# Patient Record
(demographics unavailable — no encounter records)

---

## 2024-11-14 NOTE — HISTORY OF PRESENT ILLNESS
[FreeTextEntry1] : Follow-up for BPH with urinary retention, patient is taking Flomax 2 capsules and Proscar once daily, used to do CIC once daily which patient stopped by the end of May, reports doing well since stopping CIC, occasional dripping of urine after urination, otherwise no urological symptom. PVR 161ml today  He walks with a walker.  Patient is currently on Parkinson's medication with carbidopa.  Patient is asymptomatic.  Continue Flomax and Proscar.  Follow-up in 3 months for repeat PVR.  Please refer to URO Consult Note.

## 2024-11-14 NOTE — LETTER BODY
[FreeTextEntry1] : Riaz Hill MD [address], [address], [place] [address] [phone]  Dear Dr. Hill,  Reason for Visit: Urinary retention  This is a 78 year-old gentleman with urinary retention. Patient has history Guillain-[place] syndrome. His last episode was approximately 1 year ago after he received vaccination. He is slowly recovering from his GBS. UDS previously demonstrated evidence of detrusor instability with bladder obstruction. Patient has failed 3 voiding trials. He walks with a walker. Patient is currently on Parkinson's medication with carbidopa. Patient returns today for follow-up. Since he was last seen, patient continue to take Flomax BID and Proscar.  Patient reports taking the medications regularly without any side effects or difficulties with the medication. Patient stopped CIC at the end of May 2024. He reports stable symptoms ever since stopping CIC. Patient denies any gross hematuria or dysuria or urinary incontinence. The patient denies any aggravating or relieving factors. The patient denies any interference of function. The patient is entirely asymptomatic. All other review of systems are negative. He has no cancer in his family medical history. He has no previous surgical history. Past medical history, family history and social history were inquired and were noncontributory to current condition. The patient does not use tobacco or drink alcohol. Medications and allergies were reviewed. He has no known allergies to medication.  On examination, the patient is a frail-appearing gentleman in no acute distress. He is alert and oriented follows commands. He has normal mood and affect. He is normocephalic. Oral no thrush. Neck is supple. Respirations are unlabored. His abdomen is soft and nontender. Liver is nonpalpable. Bladder is nonpalpable. No CVA tenderness. Neurologically he is grossly intact. He stands with assistance. No peripheral edema. Skin without gross abnormality. He walks with a walker.   Post-void residual on bladder scan today was 161 cc.   Assessment: BPH. Urinary retention. Guillain-[place] syndrome.  I counseled the patient. Patient reports stable symptoms with medical therapy. He will continue tot take Flomax BID and Proscar.  I renewed the patient's prescription for Flomax BID and Proscar today. I encouraged the patient to continue medications regularly as directed. Patient used to perform CIC but stopped at the end of May 2024. He reports doing well since he stopped CIC. His PVR today was 161 cc. Patient is asymptomatic. I recommended the patient follow-up in 3 months to repeat PVR evaluation. Risks and alternatives were discussed. I answered the patient questions. The patient will follow-up as directed and will contact me with any questions or concerns. Thank you for the opportunity to participate in the care of this patient. I'll keep you updated on his progress.  Plan: Continue Flomax BID and Proscar. PVR. Follow-up in 3 months for repeat PVR.  I spent 30-minutes time today on all issues related to this patient on today date of service including non face to face time.

## 2024-11-14 NOTE — ADDENDUM
[FreeTextEntry1] : Entered by Alexander Gill, acting as scribe for Dr. Riaz Quijano. The documentation recorded by the scribe accurately reflects the service I personally performed and the decisions made by me.

## 2024-12-04 NOTE — DISCUSSION/SUMMARY
[FreeTextEntry1] : EKG today shows:  Sinus Rhythm at 69 bpm. WITHIN NORMAL LIMITS. No change compared to prior tracing.  PLAN: 1.  Hypertension - BP remains in normal range. -  continue fosinopril 5 mg qd (1/2 of a 10 mg. tab).    2.  Systolic murmur due to aortic valve stenosis - murmur unchanged - will arrange TTE at next O.V. to re-assess (last TTE was in Nov. 2022).    3.  Mild enlargement of asc. aorta on last TTE; will also re-assess with f/u TTE in the spring of 2025.  4.  HLD - lipid profile in 6/2024 was in good range. - Continue current dose of Lipitor.  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  32 minutes spent on today's office visit.   He will return here for a follow up visit in 6 mos. with TTE at that time. [EKG obtained to assist in diagnosis and management of assessed problem(s)] : EKG obtained to assist in diagnosis and management of assessed problem(s)

## 2024-12-04 NOTE — ASSESSMENT
[FreeTextEntry1] : ================================== Hypertension - well controlled at this time.  Mild aortic valve stenosis on echo Nov. 2022.  Hyperlipidemia.  Mild enlargement of ascending aorta  Transient ischemic attack in 2014. Mechanism was likely hypertensive, as no arrhythmia was found on rhythm recordings. No intrinsic vascular disease seen on brain MRA. No recurrence of neuro symptoms.  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

## 2024-12-04 NOTE — PHYSICAL EXAM

## 2024-12-04 NOTE — REASON FOR VISIT
[FreeTextEntry1] :   Ramesh Cornell returns for follow-up regarding hypertension, HLD, mild AS and history of a TIA in the past.

## 2024-12-04 NOTE — ASSESSMENT
[FreeTextEntry1] : ================================== Hypertension - well controlled at this time.  Mild aortic valve stenosis on echo Nov. 2022.  Hyperlipidemia.  Mild enlargement of ascending aorta  Transient ischemic attack in 2014. Mechanism was likely hypertensive, as no arrhythmia was found on rhythm recordings. No intrinsic vascular disease seen on brain MRA. No recurrence of neuro symptoms.  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

## 2024-12-04 NOTE — PHYSICAL EXAM

## 2024-12-04 NOTE — HISTORY OF PRESENT ILLNESS
[FreeTextEntry1] : He was hospitalized in Oct. 2023 for worsening gait instability; he received the flu shot & had a root canal a few days prior.  Neurology evaluation was c/w a GBS variant component, occurring after receiving the flu vaccine. He underwent an LP, which suggested Bir[place] brainstem encephalitis.   He was treated with a 5 day course of IVIG, with improved mentation and strength.  His hospital course was complicated by urinary retention requiring Hwang catheter. He was medically stabilized and cleared for discharge to [facility].  (17 Oct 2023 15:03)  As he returns here today, he remains stable.  He continues to f/u with neuro and has been stable overall.  Currently, Sinemet has been restarted and he thinks that it has been helping.    From a cardiac standpoint, he reports no symptoms -there have been no episodes of exertional chest discomfort or TRACY.  He reports no palpitations and describes no episodes of lightheadedness/LOC.  There have been no episodes of orthopnea or PND.  He will need dental work and treatment of a R nhi-orbital basal cell (sees Dr. Dela Cruz).

## 2024-12-04 NOTE — HISTORY OF PRESENT ILLNESS
[FreeTextEntry1] : He was hospitalized in Oct. 2023 for worsening gait instability; he received the flu shot & had a root canal a few days prior.  Neurology evaluation was c/w a GBS variant component, occurring after receiving the flu vaccine. He underwent an LP, which suggested [place] brainstem encephalitis.   He was treated with a 5 day course of IVIG, with improved mentation and strength.  His hospital course was complicated by urinary retention requiring Hwang catheter. He was medically stabilized and cleared for discharge to [facility].  (17 Oct 2023 15:03)  As he returns here today, he remains stable.  He continues to f/u with neuro and has been stable overall.  Currently, Sinemet has been restarted and he thinks that it has been helping.    From a cardiac standpoint, he reports no symptoms -there have been no episodes of exertional chest discomfort or TRACY.  He reports no palpitations and describes no episodes of lightheadedness/LOC.  There have been no episodes of orthopnea or PND.  He will need dental work and treatment of a R nhi-orbital basal cell (sees Dr. Dela Cruz).

## 2025-01-09 NOTE — HISTORY OF PRESENT ILLNESS
[FreeTextEntry1] : The patient is here for a routine visit. He comes in with his wife.  [de-identified] : He is managing at home.  He uses a walker outside the home.  Diet and appetite are ok.    No chest pain or dyspnea.

## 2025-01-09 NOTE — HISTORY OF PRESENT ILLNESS
[FreeTextEntry1] : The patient is here for a routine visit. He comes in with his wife.  [de-identified] : He is managing at home.  He uses a walker outside the home.  Diet and appetite are ok.    No chest pain or dyspnea.

## 2025-01-09 NOTE — ASSESSMENT
[FreeTextEntry1] : He is managing at home.  Discussed diet and exercise.  He is ok with ADLs.  Check lipids on Atorvastatin.  BP good at 125/70.  He saw his urologist in 11/24- stable.   He saw his cardiologist last month.  Check a HgBA1c.  He avoids vaccines given the neurologic condition.  He has an eyelid BCC and he has seen Dr. Lauren and he is having an RT evaluation.  He will have reports sent.

## 2025-03-05 NOTE — PHYSICAL EXAM
[FreeTextEntry1] : Alert and in no distress, AO x 3 + Hypomimia, mild Hypophonia with slight effortful speech (mild dysarthria) No facial asymmetry Decreased blinking, Reduced facial expression mouth open at times during the exam PERRL Sensations intact LT.  No resting, action or postural tremor noted.  Hand opening and closing normal on the right questionable 1 on the left Rapid alternating movements normal bilaterally Finger taps normal bilaterally Leg agility normal on the right 1 on the left Reflexes: R L  Biceps 2+ 3+  Patellars 3++ 3++  Ankles 3+ 3+ Walks a few steps without the walker no shuffling seen, wide-based gait

## 2025-03-05 NOTE — DISCUSSION/SUMMARY
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 He was treated with immunotherapy (s/p IV steroids + 6 sessions of PLEX followed by IVIG) with significant improvement in symptoms- suggesting an underlying autoimmune/inflammatory condition.  He was readmitted to [facility] 10/2023 for recurrent weakness following the Flu vaccine and repeat CT imaging of neuroaxis unrevealing with similar findings on CSF with TNC 36 and elevated protein (117) with 2 oligoclonal bands. Pt once again treated with IVIG with significant improvement in symptoms.  In October 2023 developed gradual onset of tremors lower extremities greater than upper extremities left worse than right.  He was initiated on Sinemet and reports about 50% improvement in tremors and also better ability to ambulate.  Denies any side effects has other soft features such as reduced facial expression and reduced blink rate slightly soft speech no tremors were seen very mild left sided bradykinesia in upper extremities reduced leg agility especially on the left.  Subtle increase in tone. Father had Parkinson's disease, FDopa PET scan done in 2024 was negative in May Sinemet was discontinued.  Today patient presents with the above symptoms of not feeling as good feeling very tired having more difficulty walking feeling afraid that he will fall being excessively tired.  Legs feeling tired after walking a short distance feeling better while ambulating with walker.  Unclear if this is related to stopping levodopa his exam is unchanged compared to the time when he was on the medication  In January 2025 he had dental work with Novocain.  A few days after that he had rapid decline in mobility gait was ataxic he was admitted to [facility] and received IVIG and rehab at [place].  At this visit MRI brain and spine was done, MRI brain with multiple white matter lesions ? Demyelinating  Imp:-  # CNS inflammatory disorder- unknown etiology. ? 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 Serum MOG ab negative.   # Tremors-parkinsonism family hx + PD in father.  He feels tremors improved on sinemet  Plan:-Unclear etiology of patient's symptoms his exam has not changed on stopping Sinemet however he does feel worse since that has been stopped.  Symptoms better on restarting Sinemet.    [] Continue sinemet 1 tab TID (25/100) [] cont PT  F/u with Dr Sosa, Scheduled for March 21 All questions addressed and answered.  Follow-up in 3-4 months sooner if needed We discussed the above impression, plan and recommendation during the visit. Counseling represented more than 50% of the 30-minute visit time

## 2025-03-05 NOTE — HISTORY OF PRESENT ILLNESS
[FreeTextEntry1] : Interval history March 5, 2025.  Patient is accompanied by his wife today.  End of January patient had dental work done.  About 5 to 6 days after he started feeling weak and had difficulty walking.  He was admitted to [facility] received IVIG and was in [facility].  Now states that he has been making some progress.  He uses Sinemet 1 tablet 3 times a day.  Finds that the tremors are well-controlled with that although he still has occasional lower extremity tremors.  No side effects on Sinemet.  Interval history September 17, 2024.  Patient is accompanied by his wife today.  He states that he feels he is not making enough progress.  Wife states that he gets tired very easily he naps often he wakes up in the morning and then falls asleep again.  In the mornings he has more energy sometimes able to walk on the ground floor without a cane the other times needs to use a walker does feel better when he uses a walker sometimes is able to hold on and go up and down the steps the other times he really struggles with it.  He feels that after walking a short distance his legs start feeling weak and he needs to rest.  He was on Sinemet 1 tablet 3 times a day up until May.  He had an negative F dopa scan and levodopa was discontinued.  Patient is not sure if it was exactly around that time that he started feeling poorly but does remember not feeling so bad at that time.  He has not had any falls.  Denies any difficulty swallowing.  Denies any day-to-day fluctuation/diver no variation of symptoms  PER Dr Sosa's note Ramesh Cornell is a 76 year old  man admitted to [facility] 7/26/2022-8/22/2022 for progressive gait imbalance and dysarthria - he started to become progressively weak, having trouble walking and having falls. He was also experiencing some cognitive issues. He was becoming more lethargic and speech was more garbled. He also lost 10-15 pounds over 1 month, poor appetite. He was admitted to general neurology floor. He was noted to have severely decreased strength in UE and LE and was bradykinetic and mute.  He had extensive work up to try to identify cause of symptoms. MRI scans could not be obtained due to presence of foreign body.   CTH non contrast CT cervical spine and thoracic spine w/ multilevel spondylosis with mild to mod canal stenos sat C5-C7.  CT C/A/P with no evidence of malignancy.  Extensive CSF and serum blood work up unrevealing, CSF was significant for elevated white cells (27, lymph pred) and elevated protein (112), otherwise negative infectious work up. Paraneoplastic panel neg, AIE panel negative. NMO and MOG negative. Myasthenia labs neg. Ganglioside ab panel neg. Vitamin E def (I suspect this is incidental finding)- recommended replacement with 400 IU QD. ANGELA 1:160 speckled and + Scleroderma 1:1.   Given his continued decline in the hospital and abnormalities noted on the CSF, he was started on plasma exchange x 6 sessions and IVMP x 7 days. He improved clinically, raising the suspicion for an autoimmune/inflammatory process. During hospitalization he also had a L 6th nerve palsy. Following PLEX + IVMP, he was started on IVIG x 5 days (completed 8/20). He continued to improve clinically without any further progression. Weakness and speech improved.  Discharge neuro exam 8/21/2022-"Neurological exam with left hemiparesis (anti gravity in UE and able to bend knee/intermittent able to lift leg slightly off bed and 3/5 dorsiflexion and plantar flexion) and diffuse hyperreflexia with extensor plantar response. Facial diplegia has improved (mild weakness with eye closure) and hypophonia significantly better. Mild left 6th nerve palsy. " He was eventually discharged to subacute rehab ([facility]) on 8/22/2022.  Got flu shot 10/3, a few days later developed LE weakness and gait disturbance and was admitted to [facility]. CT of neuro axis w/o contrast with degenerative spine changes (moderate canal stenosis at C4/5 and C5/6) but no acute pathology. Repeat CSF 10/11/2023 showed TNC 36 (lymph pred), protein 117, G72, 2 OCB, IgG index normal, WNV igG + (IgM neg), cx/gs neg, VDRL neg, PCR neg, paraneoplastic panel neg, cytology with heterogenous T cells.  Treated with IVIG with clinical improvement and then discharged to [facility], now at [facility]. Doing better, still has urinary retention and needs catheter, bowel movements more regular now, can walk with a walker for 50-60 feet. Strength good.  Labs 11/29- Neg HTLV, Ganglioside ab, ANGELA, NMO ab, Syphilis, Scleroderma. MMA normal.  MUSK neg. MOG neg. VGCC neg, HIV neg. B12 972.   Patient was referred by Dr. Sosa to the movement disorders clinic and to follow-up with me to rule out Parkinson's disease In December 2023 patient started experiencing tremors.  He states that tremors started gradually around October and got worse around December.  He initially noticed the tremors in his legs more than arms.  Also would notice tremor when holding coffee cups he denies having any resting tremor.  He was initiated on Sinemet 25/100, 1 tablet 3 times a day and feels that tremors are 50% better him and his mobility and gait got slightly better.  His left foot was catching less while walking.  Memory although gradually improving is still moderately affected there are no hallucinations.  He denies any dysphagia to liquids.  Certain foods to get stuck.  Has drooling also reports that his nose runs.  Has constipation which is controlled with MiraLAX and fiber.  Has anosmia since 2022 denies having any recent falls he used to be wheelchair-bound but now is able to walk a few steps with a walker.  Does not go out as much.  He needs assistance with daily activities especially things like showering.  Denies REM behavior disorder mood is good.  Father had history of Parkinson's disease.  
Strong peripheral pulses

## 2025-03-05 NOTE — DATA REVIEWED
[de-identified] : Neuropsych test by Dr. 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

## 2025-03-21 NOTE — HISTORY OF PRESENT ILLNESS
[FreeTextEntry1] : INTERIM HX 03/21/2025: Pt admitted to hospital 1/2025 for worsening LE weakness and gait after a dental procedure on 1/20, pt treated with IVIG and felt better. He was discharged to rehab x 2 weeks. Not completely back to baseline. MRI imaging done in hospital which were c/f demyelinating disease. *** Prior to hospitalization, was able to walk without cane.  Of note, he has EMG/NCS 10/24/2024 which showed b/l CTS (R>L) and no evidence of sensory neuropathy.  Hx of TIA in 2014- talking yoav, lasted a few hours.  BP high at visit today, but pt reports BP always controlled.   INTERIM HX 05/16/2024: Saw Dr Adair 3/2024. FDOPA scan normal. Remains on sinemet. He is doing very well, able to walk without cane or walker, uses walker for longer distances. Bladder cath is now out and able to self-urinate, though needs to straight cath at times. No worsening symptoms. He never did outpt PT or OT.   INTERIM HX 01/17/2024: MG labs 12/20/2023 nl, MUSK neg. MOG neg. VGCC neg, HIV neg. B12 972.  Here with wife. He is on sinemet 1 tab TID. Tremors are a lot better. no s/e.  No recent infection.  No falls. Walks with walker. Endurance is limited.   INTERIM HX 12/15/2023: PT dc'd from [facility] 11/28. Wife reports since he has been home, he has declined and is not progressing as well as he did after discharge from rehab last week. Of note, he was diagnosed with a UTI 1 week ago and is on augmentin, had a low grade fever last night and abx duration extended. Hwang still in place, failed void test last week.  Wife reports worsening tremors. Balance has not been great. He is able to ambulate with walker but needing assistance to get out of chair and upstairs. Symptoms are worse in the end of the day. More balance issues then weakness. He denies any dizziness. Spoke to son over the phone as well. Labs 11/29- Neg HTLV, Ganglioside ab, ANGELA, NMO ab, Syphilis, Scleroderma. MMA normal.  Of note, father had PD.   INTERIM HX 11/09/2023: Got flu shot 10/3, a few days later developed LE weakness and gait disturbance and was admitted to [facility]. CT of neuro axis w/o contrast with degenerative spine changes (moderate canal stenosis at C4/5 and C5/6) but no acute pathology. Repeat CSF 10/11/2023 showed TNC 36 (lymph pred), protein 117, G72, 2 OCB, IgG index normal, WNV igG + (IgM neg), cx/gs neg, VDRL neg, PCR neg, paraneoplastic panel neg, cytology with heterogenous T cells.  Treated with IVIG with clinical improvement and then discharged to [facility], now at [facility]. Doing better, still has urinary retention and needs catheter, bowel movements more regular now, can walk with a walker for 50-60 feet. Strength good.   INTERIM HX 04/14/2023: Reviewed neuropsych results, testing ordered by Dr. Adair prior to pts hospital admission. Neuropsych test by Dr. 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 Discharged from rehab in 10/2022. "Things have been good". Done with outpatient PT/OT. Walking with cane, "I hardly use", uses for long distances. No falls. He is taking Vitamin E supplements.  Family hx of PD, had seen Dr. Adair in the past.  -------------------------------------------------------------------------------- HPI (initial visit Sep 23, 2022)- Ramesh Cornell is a 76 year old  man admitted to [facility] 7/26/2022-8/22/2022 for progressive gait imbalance and dysarthria. His symptoms began 4 weeks prior to admission. He was previously independent and able to do activities like mow the lawn and drive, however towards the beginning of July he started to become progressively weak, having trouble walking and having falls. He was also experiencing some cognitive issues, memory problems. He was becoming more lethargic and speech was more garbled. He also lost 10-15 pounds over 1 month, poor appetite. He was admitted to general neurology floor. He was noted to have severely decreased strength in UE and LE and was bradykinetic and mute.  He had extensive work up to try to identify cause of symptoms. MRI scans could not be obtained due to presence of foreign body.   CTH non contrast CT cervical spine and thoracic spine w/ multilevel spondylosis with mild to mod canal stenos sat C5-C7.  CT C/A/P with no evidence of malignancy.  Extensive CSF and serum blood work up unrevealing, CSF was significant for elevated white cells (27, lymph pred) and elevated protein (112), otherwise negative infectious work up. Paraneoplastic panel neg, AIE panel negative. NMO and MOG negative. Myasthenia labs neg. Ganglioside ab panel neg. Vitamin E def (I suspect this is incidental finding)- recommended replacement with 400 IU QD. ANGELA 1:160 speckled and + Scleroderma 1:1.   Given his continued decline in the hospital and abnormalities noted on the CSF, he was started on plasma exchange x 6 sessions and IVMP x 7 days. He improved clinically, raising the suspicion for an autoimmune/inflammatory process. During hospitalization he also had a L 6th nerve palsy. Following PLEX + IVMP, he was started on IVIG x 5 days (completed 8/20). He continued to improve clinically without any further progression. Weakness and speech improved.  Discharge neuro exam 8/21/2022-"Neurological exam with left hemiparesis (anti gravity in UE and able to bend knee/intermittent able to lift leg slightly off bed and 3/5 dorsiflexion and plantar flexion) and diffuse hyperreflexia with extensor plantar response. Facial diplegia has improved (mild weakness with eye closure) and hypophonia significantly better. Mild left 6th nerve palsy. " He was eventually discharged to subacute rehab ([facility]) on 8/22/2022.   He is accompanied, Mildred (wife). He has been doing well in PT. Slowly improving. Still with some cognitive difficulties. He gets up unassisted with walker and has been walking with walker. No progression of symptoms. He is in good spirits.

## 2025-03-21 NOTE — DATA REVIEWED
[de-identified] : Detailed in HPI [de-identified] : Neuropsych test by Dr. 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

## 2025-03-21 NOTE — ASSESSMENT
[FreeTextEntry1] : A/P- 78 year old man w/ hx of subacute onset encephalopathy with rapidly progressive quadriparesis w/ cranial nerve involvement (facial diplegia and L CN6 palsy) was admitted to [facility] 7/2022 where he underwent extensive work up including CT imaging of brain and spine which were inconclusive (unable to obtain MRI due to foreign body) and a spinal tap showing elevated CSF TNC 27 (lymph pred), protein (112) negative infectious work and neg AIE panel. He was treated with immunotherapy (s/p IV steroids + 6 sessions of PLEX followed by IVIG) with significant improvement in symptoms- suggesting an underlying autoimmune/inflammatory condition.  He was readmitted to [facility] 10/2023 for recurrent weakness following the Flu vaccine and repeat CT imaging of neuroaxis unrevealing with similar findings on CSF with TNC 36 and elevated protein (117) with 2 oligoclonal bands. Pt once again treated with IVIG with significant improvement in symptoms.  Imp:-  # CNS inflammatory disorder- unknown etiology. ? 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 Serum MOG ab negative.  # Tremors- essential tremor vs symptomatic tremors from underlying CNS disorder. Family hx + PD in father. FDOPA scan neg. Now improved on sinemet   Plan:- [] Continue sinemet 1 tab TID (25/100) - can consider slowing tapering down on the med (1 tab a week) to see if tremor worsen. Followed by Dr Adair. [] Avoid flu/COVID vaccine [] Fall precautions   F/u    The above plan was discussed with ROXANA LOZADA in great detail. ROXANA LOZADA verbalized understanding and agrees with plan as detailed above. Patient was provided education and counselling on current diagnosis/symptoms. He was advised to call our clinic at [phone] for any new or worsening symptoms, or with any questions or concerns. ROXANA LOZADA expressed understanding and all his questions/concerns were addressed.  Mary Sosa M.D.

## 2025-03-21 NOTE — PHYSICAL EXAM
[FreeTextEntry1] : Alert and in no distress, mild Hypomimia, mild Hypophonia with slight effortful speech (mild dysarthria) No facial asymmetry (mild weakness on eyelid closure b/l) PERRL Limited vertical EOM and ? mild L 6th nerve palsy Strength 5/5 UE and LE, some limitation in dorsiflexion b/l (L>R) 2/2 spasticity Increased tone in LE (more distally). Sensations intact LT.  No resting tremor or postural tremor noted. Reflexes: R L  Biceps 2+ 3+  Patellars 3++ 3++  Ankles 3+ 3+ No palmer or clonus, + babinski on the L Able to walk without assistance, normal arm swing, slow and steady gait, L foot slightly everted.  9/23/2022- MMSE 25/30.

## 2025-05-15 NOTE — HISTORY OF PRESENT ILLNESS
[FreeTextEntry1] : Patient was hospitalized  at the end of Jan and newly diagnosed with MS in March.  F/U for BPH with history of urinary retention, on Flomax BID and Proscar, reports sometimes difficulty with urination especially when holding too long. Patient used to CIC that stopped at the end of May 2024. He did not restart CIC. PVR 72ml today PSA 2.34 in May 2024  Patient has been diagnosed with multiple sclerosis.  Stable symptoms.  Continue Flomax and Proscar.  Follow-up 1 year.  Please refer to URO Consult Note.

## 2025-05-15 NOTE — LETTER BODY
[FreeTextEntry1] : Riaz Hill MD [address], [address], [place] [address] [phone]  Dear Dr. Hill,  Reason for Visit: Urinary retention  This is a 79 year-old gentleman with urinary retention. Patient has history Guillain-Squires syndrome. His last episode was approximately 2 year ago after he received vaccination. He is slowly recovering from his GBS. UDS previously demonstrated evidence of detrusor instability with bladder obstruction. Patient has failed 3 voiding trials. He walks with a walker. Patient is currently on Parkinson's medication with carbidopa. Patient returns today for follow-up. Since he was last seen, patient was hospitalized at the end of January and diagnosed with multiple sclerosis in March. Patient continue to take Flomax BID and Proscar. Patient reports taking the medications regularly without any side effects or difficulties with the medication. He reports stable urinary symptoms with medical therapy. Patient denies any gross hematuria or dysuria or urinary incontinence. The patient denies any aggravating or relieving factors. The patient denies any interference of function. The patient is entirely asymptomatic. All other review of systems are negative. He has no cancer in his family medical history. He has no previous surgical history. Past medical history, family history and social history were inquired and were noncontributory to current condition. The patient does not use tobacco or drink alcohol. Medications and allergies were reviewed. He has no known allergies to medication.  On examination, the patient is a frail-appearing gentleman in no acute distress. He is alert and oriented follows commands. He has normal mood and affect. He is normocephalic. Oral no thrush. Neck is supple. Respirations are unlabored. His abdomen is soft and nontender. Liver is nonpalpable. Bladder is nonpalpable. No CVA tenderness. Neurologically he is grossly intact. He stands with assistance. No peripheral edema. Skin without gross abnormality. He walks with a walker.  His BMP demonstrated normal renal functions, creatinine 0.99. His PSA was 2.34, which is within normal limits. His urinalysis was unremarkable. His Urine culture was negative.  Post-void residual on bladder scan today was 72 cc.   Assessment: BPH. Urinary retention, Resolved. Guillain-Squires syndrome.  I counseled the patient. Patient has been diagnosed with multiple sclerosis. He also received another dose of IVIG for Guillain-Squires syndrome In terms of his BPH, patient reports stable symptoms with medical therapy. He will continue to take Flomax BID and Proscar. I renewed the patient's prescription for Flomax BID and Proscar today. I encouraged the patient to continue medications regularly as directed. His PVR today was 72 cc which is stable. Patient is asymptomatic. I recommended the patient repeat PSA and BMP to ensure stability. I recommended the patient follow-up in 1 year to ensure stability. Risks and alternatives were discussed. I answered the patient questions. The patient will follow-up as directed and will contact me with any questions or concerns. Thank you for the opportunity to participate in the care of this patient. I'll keep you updated on his progress.  Plan: Continue Flomax BID and Proscar. PVR. PSA. BMP. Follow-up in 1 year.  I spent 30-minutes time today on all issues related to this patient on today date of service including non face to face time.

## 2025-06-04 NOTE — HISTORY OF PRESENT ILLNESS
[FreeTextEntry1] : He was hospitalized in Oct. 2023 for worsening gait instability; he received the flu shot & had a root canal a few days prior.  Neurology evaluation was c/w a GBS variant component, occurring after receiving the flu vaccine. He underwent an LP, which suggested Birkenstaff brainstem encephalitis.   He was treated with a 5 day course of IVIG, with improved mentation and strength.  His hospital course was complicated by urinary retention requiring Hwang catheter. He was medically stabilized and cleared for discharge to [facility].  (17 Oct 2023 15:03)  12/4/24 As he returns here today, he remains stable.  He continues to f/u with neuro and has been stable overall.  Currently, Sinemet has been restarted and he thinks that it has been helping.   From a cardiac standpoint, he reports no symptoms -there have been no episodes of exertional chest discomfort or TRACY.  He reports no palpitations and describes no episodes of lightheadedness/LOC.  There have been no episodes of orthopnea or PND. He will need dental work and treatment of a R nhi-orbital basal cell (sees Dr. Dela Cruz).  6/4/25 Ramesh returns today in the company of his wife.  He continues to follow-up with neurology and the exact cause of his illness remains somewhat unclear.  He continues on Sinemet.  Aubagio was recently added to his meds as well.  As I looked up the side effects of the latter medication, blood pressure elevation is described as a potential side effect. From a cardiac standpoint, he remains stable.  Currently he ambulates using a walker.  He describes no chest discomfort or exertional dyspnea.  He reports no palpitations or lightheadedness.  There have been no episodes of orthopnea or PND. I spoke to his wife over the phone recently as his blood pressure readings at home had been on higher the fosinopril was increased to the current dose of 20 mg twice daily.

## 2025-06-04 NOTE — DISCUSSION/SUMMARY
[FreeTextEntry1] : EKG today shows:  Sinus Rhythm at 73 bpm.  Normal intervals and axis.  Unremarkable tracing; no significant change.  Echo today shows: 1. Normal left and right atrial size. 2. Left ventricular systolic function is normal with an ejection fraction visually estimated at 60 to 65 %. 3. Normal right ventricular systolic function. 4. Mild aortic stenosis. 5. Ascending aorta is mildly dilated, measuring 4.10 cm (indexed 2.17 cm/m).  PLAN: 1.  Hypertension - BP a bit higher than ideal at present (? in part side effect of Aubagio?).  No hypertensive changes seen on echocardiogram today.   -  continue fosinopril 20 mg 2 times daily.   -  in the past he had been on HCTZ 25 mg qd; will have him resume this at a lesser dose of 12.5 mg. daily - His wife, Mildred, will monitor his blood pressure periodically at home. - Continue low salt diet.  Fluid intake as guided by thirst.  2.  Systolic murmur due to aortic valve stenosis - murmur unchanged -  TTE shows mild A.S., which is unchanged.    3.  Mild enlargement of asc. aorta on last TTE; appears stable on current study at 4.1 cm.   4.  HLD - lipid profile in 1/2025 was in good range. - Continue Lipitor at present dose.  I encouraged him to remain physically active.  Now that the weather is milder, I suggest that he try to walk out of doors with his aid in an effort to maintain his leg strength and sense of balance.  41 minutes spent on today's office visit.   He will return here for a follow up visit in 3 mos. to monitor his BP.  [EKG obtained to assist in diagnosis and management of assessed problem(s)] : EKG obtained to assist in diagnosis and management of assessed problem(s)

## 2025-06-04 NOTE — ASSESSMENT
[FreeTextEntry1] : ================================== Hypertension - well controlled at this time.  Mild aortic valve stenosis on echo Nov. 2022.  Hyperlipidemia.  Mild enlargement of ascending aorta  Transient ischemic attack in 2014. Mechanism was likely hypertensive, as no arrhythmia was found on rhythm recordings. No intrinsic vascular disease seen on brain MRA. No recurrence of neuro symptoms.  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

## 2025-07-07 NOTE — ASSESSMENT
[FreeTextEntry1] : A/P- 79 year old man w/ hx of subacute onset encephalopathy with rapidly progressive quadriparesis w/ cranial nerve involvement (facial diplegia and L CN6 palsy) was admitted to [facility] 7/2022 where he underwent extensive work up including CT imaging of brain and spine which were inconclusive (unable to obtain MRI due to foreign body at the time) and a spinal tap showing elevated CSF TNC 27 (lymph pred), protein (112) negative infectious work and neg AIE panel. He was treated with immunotherapy (s/p IV steroids + 6 sessions of PLEX followed by IVIG) with significant improvement in symptoms- suggesting an underlying autoimmune/inflammatory condition. He had a recurrent similar presentation 10/2023 following flu/COVID vaccination and was again admitted and received IVIG with clinical improvement and with repeat CSF c/w similar inflammatory changes with 2 OCB. Most recent admission 1/2025 with gait imbalance s/p IVIG with clinical improvement and MRI findings or neuroaxis c/f inactive demyelinating disease.  Imp:-  # CNS inflammatory disorder, relapsing- likely relapsing multiple sclerosis.  Serum MOG ab negative. CSF with + OCB. His age would be atypical for new onset MS or MS relapses. I wonder whether he has had longstanding dormant MS and the initial 2 hospital admissions were 2/2 Bickerstaff encephalitis vs recurrent ADEM and most recent episode was simply a pseudo flare (darron given no active lesions on MRI)   # Tremors- essential tremor vs symptomatic tremors from underlying CNS disorder (multiple brainstem/cerebellar lesions). Family hx + PD in father. FDOPA scan neg. Now improved on sinemet. Followed by Dr Adair.   Plan:- [] Continue Aubagio 14 mg BID. LFT monthly for the first 6 months to monitor AST/ALT. [] Plan to repeat MRI brain and C/T spine waw contrast [] Repeat CSF NMO and MOG ab (MAS1 to [facility]) [] Serum encephalopathy panel [] Avoid flu/COVID vaccine [] Fall precautions [] Referral to PT for gait and balance training   F/u 3 months   The above plan was discussed with ROXANA LOZADA in great detail. ROXANA LOZADA verbalized understanding and agrees with plan as detailed above. Patient was provided education and counselling on current diagnosis/symptoms. He was advised to call our clinic at [phone] for any new or worsening symptoms, or with any questions or concerns. ROXANA SAMPSONNESSY expressed understanding and all his questions/concerns were addressed.  Mary Sosa M.D.

## 2025-07-07 NOTE — PHYSICAL EXAM
[FreeTextEntry1] : Alert and in no distress, AO x 3 mild Hypomimia, mild Hypophonia with slight effortful speech (mild dysarthria) No facial asymmetry (mild weakness on eyelid closure b/l) PERRL Limited vertical EOM and ? mild L 6th nerve palsy Strength 5/5 UE and LE, some limitation in dorsiflexion b/l (L>R) 2/2 spasticity (4/5) Increased tone in LE (more distally). Sensations intact LT.  No resting tremor or postural tremor noted. Mild action tremor b/l Reflexes: R L  Biceps 2+ 3+  Patellars 3++ 3++  Ankles 3+ 3+ No palmer or clonus, + babinski on the L Able to walk without assistance, normal arm swing, slow and steady gait, L foot slightly everted. Mild steppage gait b/l.  9/23/2022- MMSE 25/30.

## 2025-07-07 NOTE — DATA REVIEWED
[de-identified] : MRI brain and C/T spine waw contrast 1/2025- c/f demyelinating disease with multiple supratentorial WM lesion (some MV ischemic and others appearing more demyelinating) with involvement of brainstem. brachium pontis and cerebellum. ventral C1/2 and ventral lower thoracic cord lesion T10 (non-enhancing).  [de-identified] : Neuropsych test by Dr. 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

## 2025-07-15 NOTE — ASSESSMENT
[Vaccines Reviewed] : Immunizations reviewed today. Please see immunization details in the vaccine log within the immunization flowsheet.  [FreeTextEntry1] : His neurological condition was discussed.  He has had some overall progression of symptoms.  He manages at home with assistance.  He saw his neurologist and movement disorders specialist.  [facility]  An MRI brain and spine is planned in two weeks.  MS has been considered.  The BP is acceptable at 145/72.  HCTZ 12.5 mg recently started.  He saw his cardiologist, Dr. Roque.  Check lipids on Atorvastatin.  Discussed diet.    Check a HgBa1c.  He saw his urologist in 5/25.  He has been seen for the [facility] and he should follow-up.  S/p Prevnar and Pneumovax.  Vaccines avoided now.    Colonoscopy 7/21.

## 2025-07-15 NOTE — HISTORY OF PRESENT ILLNESS
[FreeTextEntry1] : The patient is here for a routine visit.  [de-identified] : He is managing at home with his wife's help and he now has an aide.  He walks with a cane at home and a walker outside.  He has more symptoms compared to when last seen here.  he needs help with ADLs.   Exercise limited.  Diet and exercise ok.

## 2025-07-15 NOTE — HEALTH RISK ASSESSMENT
[No] : No [0] : 2) Feeling down, depressed, or hopeless: Not at all (0) [Patient/Caregiver not ready to engage] : , patient/caregiver not ready to engage [[id number]] : 0 [Change in mental status noted] : No change in mental status noted [Language] : denies difficulty with language [Behavior] : denies difficulty with behavior [Learning/Retaining New Information] : denies difficulty learning/retaining new information [Handling Complex Tasks] : denies difficulty handling complex tasks [Reasoning] : denies difficulty with reasoning [Spatial Ability and Orientation] : denies difficulty with spatial ability and orientation [de-identified] : needs assistance

## 2025-07-23 NOTE — HISTORY OF PRESENT ILLNESS
[FreeTextEntry1] : Interval History July 23, 2025: Was started on aubagio, 4/25, still taking that. Currently on Sinemet 25-100mg TID ( 9,1,9) - helps quite a bit. with tremors. Reports tremors when he's tired. Endurance is limited.  Visited [facility] specialty center who was questioning diagnosis of MS  Repeat MRI on monday- Brain and whole spine w/wo contrast Fall 7 weeks ago, L. leg gave out. Foot gave away and fell  Lost his balance and fell backwards -while filling bird bath and turning. No head strike, had to hold onto object to steady himself  Mood been okay, cognitively - confusion with financial, showering  No difficulty swallowing, coughs at mealtime if eating fast.  No dysphagia to liquids   no incontinence  Interval history March 5, 2025. Patient is accompanied by his wife today. End of January patient had dental work done. About 5 to 6 days after he started feeling weak and had difficulty walking. He was admitted to [facility] received IVIG and was in [place] [facility]. Now states that he has been making some progress. He uses Sinemet 1 tablet 3 times a day. Finds that the tremors are well-controlled with that although he still has occasional lower extremity tremors. No side effects on Sinemet.  Interval history September 17, 2024. Patient is accompanied by his wife today. He states that he feels he is not making enough progress. Wife states that he gets tired very easily he naps often he wakes up in the morning and then falls asleep again. In the mornings he has more energy sometimes able to walk on the ground floor without a cane the other times needs to use a walker does feel better when he uses a walker sometimes is able to hold on and go up and down the steps the other times he really struggles with it. He feels that after walking a short distance his legs start feeling weak and he needs to rest. He was on Sinemet 1 tablet 3 times a day up until May. He had an negative F dopa scan and levodopa was discontinued. Patient is not sure if it was exactly around that time that he started feeling poorly but does remember not feeling so bad at that time. He has not had any falls. Denies any difficulty swallowing. Denies any day-to-day fluctuation/diver no variation of symptoms  PER Dr Sosa's note Ramesh Cornell is a 76 year old RH man admitted to [facility] 7/26/2022-8/22/2022 for progressive gait imbalance and dysarthria - he started to become progressively weak, having trouble walking and having falls. He was also experiencing some cognitive issues. He was becoming more lethargic and speech was more garbled. He also lost 10-15 pounds over 1 month, poor appetite. He was admitted to general neurology floor. He was noted to have severely decreased strength in UE and LE and was bradykinetic and mute. He had extensive work up to try to identify cause of symptoms. MRI scans could not be obtained due to presence of foreign body.  CTH non contrast CT cervical spine and thoracic spine w/ multilevel spondylosis with mild to mod canal stenos sat C5-C7. CT C/A/P with no evidence of malignancy. Extensive CSF and serum blood work up unrevealing, CSF was significant for elevated white cells (27, lymph pred) and elevated protein (112), otherwise negative infectious work up. Paraneoplastic panel neg, AIE panel negative. NMO and MOG negative. Myasthenia labs neg. Ganglioside ab panel neg. Vitamin E def (I suspect this is incidental finding)- recommended replacement with 400 IU QD. ANGELA 1:160 speckled and + Scleroderma 1:1.  Given his continued decline in the hospital and abnormalities noted on the CSF, he was started on plasma exchange x 6 sessions and IVMP x 7 days. He improved clinically, raising the suspicion for an autoimmune/inflammatory process. During hospitalization he also had a L 6th nerve palsy. Following PLEX + IVMP, he was started on IVIG x 5 days (completed 8/20). He continued to improve clinically without any further progression. Weakness and speech improved. Discharge neuro exam 8/21/2022-"Neurological exam with left hemiparesis (anti gravity in UE and able to bend knee/intermittent able to lift leg slightly off bed and 3/5 dorsiflexion and plantar flexion) and diffuse hyperreflexia with extensor plantar response. Facial diplegia has improved (mild weakness with eye closure) and hypophonia significantly better. Mild left 6th nerve palsy. " He was eventually discharged to subacute rehab ([facility]) on 8/22/2022.  Got flu shot 10/3, a few days later developed LE weakness and gait disturbance and was admitted to [facility]. CT of neuro axis w/o contrast with degenerative spine changes (moderate canal stenosis at C4/5 and C5/6) but no acute pathology. Repeat CSF 10/11/2023 showed TNC 36 (lymph pred), protein 117, G72, 2 OCB, IgG index normal, WNV igG + (IgM neg), cx/gs neg, VDRL neg, PCR neg, paraneoplastic panel neg, cytology with heterogenous T cells. Treated with IVIG with clinical improvement and then discharged to [place] [facility], now at [facility]. Doing better, still has urinary retention and needs catheter, bowel movements more regular now, can walk with a walker for 50-60 feet. Strength good. Labs 11/29- Neg HTLV, Ganglioside ab, ANGELA, NMO ab, Syphilis, Scleroderma. MMA normal. MUSK neg. MOG neg. VGCC neg, HIV neg. B12 [id number].  Patient was referred by Dr. Sosa to the movement disorders clinic and to follow-up with me to rule out Parkinson's disease In December 2023 patient started experiencing tremors. He states that tremors started gradually around October and got worse around December. He initially noticed the tremors in his legs more than arms. Also would notice tremor when holding coffee cups he denies having any resting tremor. He was initiated on Sinemet 25/100, 1 tablet 3 times a day and feels that tremors are 50% better him and his mobility and gait got slightly better. His left foot was catching less while walking.  Memory although gradually improving is still moderately affected there are no hallucinations. He denies any dysphagia to liquids. Certain foods to get stuck. Has drooling also reports that his nose runs. Has constipation which is controlled with MiraLAX and fiber. Has anosmia since 2022 denies having any recent falls he used to be wheelchair-bound but now is able to walk a few steps with a walker. Does not go out as much. He needs assistance with daily activities especially things like showering. Denies REM behavior disorder mood is good. Father had history of Parkinson's disease.

## 2025-07-23 NOTE — PHYSICAL EXAM
[FreeTextEntry1] : Alert and in no distress, AO x 3 + Hypomimia, mild Hypophonia with slight effortful speech (mild dysarthria) No facial asymmetry Decreased blinking, Reduced facial expression during the exam PERRL Difficulty following instructions for EOM Questionable decreased abduction on L. gaze and downward movement but corrects and is inconsistent Sensations intact LT. No resting, action or postural tremor noted. Hand opening and closing normal on the right questionable 1 on the left Rapid alternating movements normal bilaterally Finger taps normal bilaterally Leg agility normal on the right 1 on the left Reflexes: R L  Biceps 2+ 3+  Patellars 3++ 3++  Ankles 3+ 3+ Walks a few steps without the walker

## 2025-07-23 NOTE — ASSESSMENT
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 7/2022, where he underwent extensive work up including CT imaging of brain and spine which were inconclusive (unable to obtain MRI due to foreign body) and a spinal tap showing elevated CSF TNC 27 (lymph pred), protein (112) negative infectious work and neg AIE panel. He was treated with immunotherapy (s/p IV steroids + 6 sessions of PLEX followed by IVIG) with significant improvement in symptoms- suggesting an underlying autoimmune/inflammatory condition.  He was readmitted to [facility] 10/2023 for recurrent weakness following the Flu vaccine and repeat CT imaging of neuroaxis unrevealing with similar findings on CSF with TNC 36 and elevated protein (117) with 2 oligoclonal bands. Pt once again treated with IVIG with significant improvement in symptoms.  In October 2023 developed gradual onset of tremors lower extremities greater than upper extremities left worse than right. He was initiated on Sinemet and reports about 50% improvement in tremors and also better ability to ambulate. Denies any side effects has other soft features such as reduced facial expression and reduced blink rate slightly soft speech no tremors were seen very mild left sided bradykinesia in upper extremities reduced leg agility especially on the left. Subtle increase in tone. Father had Parkinson's disease, FDopa PET scan done in 2024 was negative in May Sinemet was discontinued. Today patient presents with the above symptoms of not feeling as good feeling very tired having more difficulty walking feeling afraid that he will fall being excessively tired. Legs feeling tired after walking a short distance feeling better while ambulating with walker. Unclear if this is related to stopping levodopa his exam is unchanged compared to the time when he was on the medication  In January 2025 he had dental work with Novocain. A few days after that he had rapid decline in mobility gait was ataxic he was admitted to [facility] and received IVIG and rehab at [place]. At this visit MRI brain and spine was done, MRI brain with multiple white matter lesions ? 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 Serum MOG ab negative.  # Tremors-parkinsonism family hx + PD in father. He feels tremors improved on sinemet  Plan:-Unclear etiology of patient's symptoms- subjective improvement in symptoms of tremors with Sinemet   [] Continue sinemet 1 tab TID (25/100) [] cont PT [] RTC in 6 months  Seen and examined with Dr. Adair We discussed the above impression, plan and recommendation during the visit. Counseling represented more than 50% of the 30-minute visit time